# Patient Record
(demographics unavailable — no encounter records)

---

## 2024-11-18 NOTE — PHYSICAL EXAM
[Chaperone Present] : A chaperone was present in the examining room during all aspects of the physical examination [Appropriately responsive] : appropriately responsive [Alert] : alert [No Acute Distress] : no acute distress [No Lymphadenopathy] : no lymphadenopathy [Oriented x3] : oriented x3 [Labia Majora] : normal [Labia Minora] : normal [Absent] : absent [Uterine Adnexae] : non-palpable [Soft] : soft [Non-tender] : non-tender [Non-distended] : non-distended [Examination Of The Breasts] : a normal appearance [Breast Palpation Diffuse Fibrous Tissue Bilateral] : fibrocystic changes [Normal] : normal [No Masses] : no breast masses were palpable [FreeTextEntry2] : CHRISTIANO Zaldivar [FreeTextEntry6] : no masses no tenderness no adenopathy. [FreeTextEntry8] : limiting body habitus adnexa not palpable fullness bilateral with hx of 4 cm cyst of rt ovary

## 2024-11-18 NOTE — DISCUSSION/SUMMARY
[FreeTextEntry1] : GYN Annual evaluation today TV Sonogram done today for adnexal fullness - no masses  we discussed adnexal fullness, decreased libido, vasomotor symptoms / menopause( labs requested)OAB and urine loss.  Patient verbalized understanding of all explanations, and her questions were answered, and all concerns were addressed.  Patient was screened for depression - no signs of clinical depression. PHQ-2 on file Rx given for mammogram and B sonogram Referred to see [facility] for PFT continue with urologist Dr Hayes  referral for decreased libido to [facility] and she declined hormonal therapy for now first will try Relixen and Ristela  RTO in 1 year for annual   I, Lesley neal acting as scribe for Dr. Ramachandran. 11/18/2024     The documentation recorded by the scribe, in my presence, accurately reflects the service I personally performed, and the decisions made by me with my edits as appropriate on 11/18/2024  Marilynn Ramachandran MD, FACOG

## 2024-11-18 NOTE — HISTORY OF PRESENT ILLNESS
[Patient reported mammogram was normal] : Patient reported mammogram was normal [Patient reported breast sonogram was normal] : Patient reported breast sonogram was normal [Patient reported PAP Smear was normal] : Patient reported PAP Smear was normal [N] : Patient does not use contraception [No] : none [Hot Flashes] : hot flashes [Night Sweats] : night sweats [Yes] : Patient has concerns regarding sex [TextBox_4] : 42YO F patient presents for GYN annual exam. Hx of having right hemorrhagic ovarian cyst November 2023.Patient reports she has overactive bladder and incontinence. 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 Patient had reaction to KEFLEX and activated the hemolytic anemia. She had Sling Mesh with urologist Dr Williamson s/p failed Bulk amid injection for STALIN. She started to see Dr Татьяна Vargas in [facility] due to loss of urine, Then diagnosed with Overactive Bladder. The patient reports the sling is helpful but the overactive bladder is not controlled and Dr Vargas -  Plan for PNS (Percutaneous Nerve Stimulation) for 12 weeks.  She reports poor libido and felt for the last 2 months hot flashes at night  [Mammogramdate] : 3/20/23 [BreastSonogramDate] : 3/20/23 [PapSmeardate] : 10/14/22 [HPVDate] : 10/14/22 [LMPDate] : 2022 [TextBox_6] : s/p hysterectomy so no menses  [FreeTextEntry1] : decreased libido